# Patient Record
Sex: MALE | Employment: STUDENT | URBAN - METROPOLITAN AREA
[De-identification: names, ages, dates, MRNs, and addresses within clinical notes are randomized per-mention and may not be internally consistent; named-entity substitution may affect disease eponyms.]

---

## 2024-01-19 ENCOUNTER — HOSPITAL ENCOUNTER (EMERGENCY)
Age: 19
Discharge: HOME | End: 2024-01-20
Attending: EMERGENCY MEDICINE
Payer: COMMERCIAL

## 2024-01-19 VITALS
SYSTOLIC BLOOD PRESSURE: 155 MMHG | DIASTOLIC BLOOD PRESSURE: 70 MMHG | RESPIRATION RATE: 18 BRPM | TEMPERATURE: 97.8 F | HEART RATE: 54 BPM | OXYGEN SATURATION: 100 % | WEIGHT: 194 LBS

## 2024-01-19 DIAGNOSIS — R09.81 SINUS CONGESTION: ICD-10-CM

## 2024-01-19 DIAGNOSIS — H92.01 ACUTE OTALGIA, RIGHT: ICD-10-CM

## 2024-01-19 DIAGNOSIS — J02.9 ACUTE PHARYNGITIS, UNSPECIFIED ETIOLOGY: Primary | ICD-10-CM

## 2024-01-19 LAB — SARS-COV-2 RDRP RESP QL NAA+PROBE: NOT DETECTED

## 2024-01-19 PROCEDURE — 99283 EMERGENCY DEPT VISIT LOW MDM: CPT

## 2024-01-19 PROCEDURE — 87635 SARS-COV-2 COVID-19 AMP PRB: CPT

## 2024-01-19 RX ORDER — AMOXICILLIN 500 MG/1
500 CAPSULE ORAL 3 TIMES DAILY
Qty: 21 CAPSULE | Refills: 0 | Status: SHIPPED | OUTPATIENT
Start: 2024-01-19 | End: 2024-01-26

## 2024-01-20 NOTE — ED PROVIDER NOTES
[facility] Emergency Department      Pt Name: Maximino Camargo  MRN: [id number]  Birthdate 2005  Date of evaluation: 1/19/2024  Provider: BERTO GORDON MD  CHIEF COMPLAINT  Chief Complaint   Patient presents with    Pharyngitis    Cough    Nasal Congestion     Pt states he's had cough/congestion and sore throat that is getting worse with bilateral ear pain with pressure. Lozenges, Robitussin and Dayquil in use at home. Friend has similar symptoms.     Otalgia     HPI  Maximino Camargo is a 18 y.o. male who presents because of cough, sore throat.  He has had it for 4 days.  Symptoms with a sore throat has been getting worse.  He is having pressure in his years, right more than left.  He has tried some over-the-counter medications without relief.  He has a friend with similar symptoms and his father had COVID recently.  He does not get throat or ear infections frequently.  Denies any vomiting or diarrhea.  Denies any high fevers.  Cough is not productive.  Denies current shortness of breath.    REVIEW OF SYSTEMS:  No abdominal pain, no diarrhea Pertinent positives and negatives as per the HPI.  All other pertinent review of systems reviewed and negative.  Nursing notes reviewed.    PAST MEDICAL HISTORY  History reviewed. No pertinent past medical history.  SURGICAL HISTORY  History reviewed. No pertinent surgical history.  MEDICATIONS:  No current facility-administered medications on file prior to encounter.     No current outpatient medications on file prior to encounter.     ALLERGIES  Patient has no known allergies.  SOCIAL HISTORY:  Social History     Tobacco Use    Smoking status: Unknown   Substance Use Topics    Alcohol use: Never    Drug use: Never     IMMUNIZATIONS:    There is no immunization history on file for this patient.    PHYSICAL EXAM  VITAL SIGNS:  Blood pressure (!) 155/70, pulse 54, temperature 97.8 °F (36.6 °C), temperature source Oral, resp. rate 18, weight 88 kg (194 lb), SpO2 100

## 2024-02-17 ENCOUNTER — APPOINTMENT (OUTPATIENT)
Dept: GENERAL RADIOLOGY | Age: 19
End: 2024-02-17
Payer: COMMERCIAL

## 2024-02-17 ENCOUNTER — APPOINTMENT (OUTPATIENT)
Dept: CT IMAGING | Age: 19
End: 2024-02-17
Payer: COMMERCIAL

## 2024-02-17 ENCOUNTER — HOSPITAL ENCOUNTER (EMERGENCY)
Age: 19
Discharge: HOME | End: 2024-02-17
Attending: EMERGENCY MEDICINE
Payer: COMMERCIAL

## 2024-02-17 VITALS
HEART RATE: 79 BPM | OXYGEN SATURATION: 99 % | HEIGHT: 73 IN | BODY MASS INDEX: 26.51 KG/M2 | SYSTOLIC BLOOD PRESSURE: 140 MMHG | WEIGHT: 200 LBS | DIASTOLIC BLOOD PRESSURE: 79 MMHG | RESPIRATION RATE: 12 BRPM | TEMPERATURE: 97.8 F

## 2024-02-17 DIAGNOSIS — S93.401A SPRAIN OF RIGHT ANKLE, UNSPECIFIED LIGAMENT, INITIAL ENCOUNTER: Primary | ICD-10-CM

## 2024-02-17 PROCEDURE — 73630 X-RAY EXAM OF FOOT: CPT

## 2024-02-17 PROCEDURE — 99284 EMERGENCY DEPT VISIT MOD MDM: CPT

## 2024-02-17 PROCEDURE — 73610 X-RAY EXAM OF ANKLE: CPT

## 2024-02-17 PROCEDURE — 73700 CT LOWER EXTREMITY W/O DYE: CPT

## 2024-02-17 ASSESSMENT — PAIN DESCRIPTION - ORIENTATION: ORIENTATION: RIGHT

## 2024-02-17 ASSESSMENT — PAIN DESCRIPTION - DESCRIPTORS: DESCRIPTORS: DISCOMFORT

## 2024-02-17 ASSESSMENT — PAIN SCALES - GENERAL: PAINLEVEL_OUTOF10: 6

## 2024-02-17 ASSESSMENT — PAIN DESCRIPTION - FREQUENCY: FREQUENCY: CONTINUOUS

## 2024-02-17 ASSESSMENT — PAIN DESCRIPTION - PAIN TYPE: TYPE: ACUTE PAIN

## 2024-02-17 ASSESSMENT — PAIN - FUNCTIONAL ASSESSMENT
PAIN_FUNCTIONAL_ASSESSMENT: ACTIVITIES ARE NOT PREVENTED
PAIN_FUNCTIONAL_ASSESSMENT: 0-10

## 2024-02-17 ASSESSMENT — PAIN DESCRIPTION - ONSET: ONSET: ON-GOING

## 2024-02-17 ASSESSMENT — PAIN DESCRIPTION - LOCATION: LOCATION: FOOT;ANKLE

## 2024-02-17 NOTE — ED PROVIDER NOTES
Comments: No significant swelling noted to the foot or ankle.  Strong pedal pulses x 2.  Normal range of motion of the knee no tenderness over the calf or shin.  Decreased range of motion of the ankle secondary to pain.  Tenderness along the lateral foot lateral ankle at the lateral malleolus.  No deformity no crepitus no bruising neuro vas intact distally.   Skin:     General: Skin is warm.      Findings: No rash.   Neurological:      Mental Status: He is alert and oriented to person, place, and time.      Motor: No abnormal muscle tone.      Coordination: Coordination normal.   Psychiatric:         Mood and Affect: Mood normal.         Behavior: Behavior normal.           MDM/ED Course    ED Medication Orders (From admission, onward)      None                Radiology  CT ANKLE RIGHT WO CONTRAST    Result Date: 2/17/2024  CT ANKLE RIGHT WO CONTRAST INDICATION: Right ankle injury. Pain. COMPARISON: Same-day radiographs. TECHNIQUE:  CT of the right ankle was performed without intravenous contrast. The axial data set was reformatted in the coronal and sagittal planes. Up-to-date CT equipment and radiation dose reduction techniques were employed. FINDINGS: No acute fracture or malalignment. There is a tiny ossicle along the anterior rim of the distal tibia which appears corticated and is likely sequela of old trauma. No significant joint effusion. Tibiotalar and subtalar joint spaces are normal. Incidentally noted os trigonum. There is diffuse swelling about the ankle which can be seen with injury/sprain.     1.  No fracture. 2.  Tiny ossicle along the anterior distal tibia appears corticated and is likely sequela of remote trauma. 3.  Soft tissue swelling about the ankle as can be seen with injury/sprain.    XR ANKLE RIGHT (MIN 3 VIEWS)    Result Date: 2/17/2024  EXAM: RIGHT ANKLE 3 VIEWS INDICATION: Right ankle pain. Injury. COMPARISON: None FINDINGS: Soft tissue swelling about the anterior ankle. There is a  understanding of plan and agreeable to plan.        Clinical Impression:  1. Sprain of right ankle, unspecified ligament, initial encounter          Disposition:  Discharge to home in good condition.    Blood pressure (!) 140/79, pulse 79, temperature 97.8 °F (36.6 °C), temperature source Oral, resp. rate 12, height 1.854 m (6' 1\"), weight 90.7 kg (200 lb), SpO2 99 %.    Patient was given scripts for the following medications. I counseled patient how to take these medications.   New Prescriptions    No medications on file     Modified Medications    No medications on file       Disposition referral (if applicable):  [facility]  [phone]  Schedule an appointment as soon as possible for a visit       [facility]  [address] [address]  [phone]  Go to   As needed, If symptoms worsen      I, Alfredito Lebron, am the primary attending of record and contributed the majority of evaluation and treatment of emergent care for this encounter.     Total critical care time is 0 minutes, which excludes separately billable procedures and updating family. Time spent is specifically for management of the presenting complaint and symptoms initially, direct bedside care, reevaluation, review of records, and consultation.  There was a high probability of clinically significant life-threatening deterioration in the patient's condition, which required my urgent intervention.     This chart was generated in part by using Dragon Dictation system and may contain errors related to that system including errors in grammar, punctuation, and spelling, as well as words and phrases that may be inappropriate. If there are any questions or concerns please feel free to contact the dictating provider for clarification.     Alfredito Lebron MD   [employer]        Alfredito Lebron MD  02/17/24 [id number]